# Patient Record
Sex: FEMALE | Race: WHITE
[De-identification: names, ages, dates, MRNs, and addresses within clinical notes are randomized per-mention and may not be internally consistent; named-entity substitution may affect disease eponyms.]

---

## 2020-07-20 ENCOUNTER — NURSE TRIAGE (OUTPATIENT)
Dept: OTHER | Facility: CLINIC | Age: 50
End: 2020-07-20

## 2020-07-20 NOTE — TELEPHONE ENCOUNTER
Reason for Disposition   Blood in urine (red, pink, or tea-colored)    Answer Assessment - Initial Assessment Questions  1. LOCATION: \"Where does it hurt? \" (e.g., left, right)      both  2. ONSET: \"When did the pain start? \"      This morning  3. SEVERITY: \"How bad is the pain? \" (e.g., Scale 1-10; mild, moderate, or severe)    - MILD (1-3): doesn't interfere with normal activities     - MODERATE (4-7): interferes with normal activities or awakens from sleep     - SEVERE (8-10): excruciating pain and patient unable to do normal activities (stays in bed)        6  4. PATTERN: \"Does the pain come and go, or is it constant? \"       *No Answer*  5. CAUSE: \"What do you think is causing the pain? \"      *No Answer*  6. OTHER SYMPTOMS:  \"Do you have any other symptoms? \" (e.g., fever, abdominal pain, vomiting, leg weakness, burning with urination, blood in urine)      Frequency, abd pain  7. PREGNANCY:  \"Is there any chance you are pregnant? \" \"When was your last menstrual period? \"      *No Answer*    Protocols used:  [facility]

## 2023-08-16 PROBLEM — J40 SINOBRONCHITIS: Status: RESOLVED | Noted: 2023-08-16 | Resolved: 2023-08-16

## 2023-08-16 PROBLEM — M65.949 TENOSYNOVITIS OF FINGER: Status: ACTIVE | Noted: 2023-08-16

## 2023-08-16 PROBLEM — R10.9 LEFT FLANK PAIN: Status: ACTIVE | Noted: 2023-08-16

## 2023-08-16 PROBLEM — R53.83 FATIGUE: Status: ACTIVE | Noted: 2023-08-16

## 2023-08-16 PROBLEM — H02.402 PTOSIS OF LEFT EYELID: Status: ACTIVE | Noted: 2023-08-16

## 2023-08-16 PROBLEM — J30.9 ALLERGIC RHINITIS: Status: ACTIVE | Noted: 2023-08-16

## 2023-08-16 PROBLEM — R10.9 ABDOMINAL PAIN: Status: ACTIVE | Noted: 2023-08-16

## 2023-08-16 PROBLEM — M25.562 LEFT KNEE PAIN: Status: ACTIVE | Noted: 2023-08-16

## 2023-08-16 PROBLEM — R29.898 LEG WEAKNESS, BILATERAL: Status: ACTIVE | Noted: 2019-09-16

## 2023-08-16 PROBLEM — M22.42 CHONDROMALACIA OF LEFT PATELLA: Status: ACTIVE | Noted: 2023-08-16

## 2023-08-16 PROBLEM — R39.89 BLADDER PAIN: Status: ACTIVE | Noted: 2023-08-16

## 2023-08-16 PROBLEM — J32.9 SINOBRONCHITIS: Status: RESOLVED | Noted: 2023-08-16 | Resolved: 2023-08-16

## 2023-08-16 PROBLEM — M54.16 LUMBAR NERVE ROOT IMPINGEMENT: Status: ACTIVE | Noted: 2023-08-16

## 2023-08-16 PROBLEM — L68.0 HIRSUTISM: Status: ACTIVE | Noted: 2023-08-16

## 2023-08-16 PROBLEM — M25.50 ARTHRALGIA: Status: ACTIVE | Noted: 2023-08-16

## 2023-08-16 PROBLEM — R31.29 OTHER MICROSCOPIC HEMATURIA: Status: ACTIVE | Noted: 2023-08-16

## 2023-08-16 PROBLEM — M43.16 SPONDYLOLISTHESIS, LUMBAR REGION: Status: ACTIVE | Noted: 2019-08-20

## 2023-08-16 PROBLEM — G89.29 CHRONIC LOWER BACK PAIN: Status: ACTIVE | Noted: 2023-08-16

## 2023-08-16 PROBLEM — F17.200 NICOTINE DEPENDENCE: Status: ACTIVE | Noted: 2023-08-16

## 2023-08-16 PROBLEM — R79.89 ELEVATED INSULIN-LIKE GROWTH FACTOR 1 (IGF-1) LEVEL: Status: ACTIVE | Noted: 2023-08-16

## 2023-08-16 PROBLEM — R31.9 HEMATURIA: Status: ACTIVE | Noted: 2023-08-16

## 2023-08-16 PROBLEM — R19.00 PELVIC MASS IN FEMALE: Status: ACTIVE | Noted: 2023-08-16

## 2023-08-16 PROBLEM — N20.0 CALCULUS OF KIDNEY: Status: ACTIVE | Noted: 2023-08-16

## 2023-08-16 PROBLEM — M54.30 SCIATICA: Status: ACTIVE | Noted: 2023-08-16

## 2023-08-16 PROBLEM — M65.9 TENOSYNOVITIS OF FINGER: Status: ACTIVE | Noted: 2023-08-16

## 2023-08-16 PROBLEM — N20.1 LEFT URETERAL CALCULUS: Status: ACTIVE | Noted: 2023-08-16

## 2023-08-16 PROBLEM — R35.0 URINARY FREQUENCY: Status: RESOLVED | Noted: 2023-08-16 | Resolved: 2023-08-16

## 2023-08-16 PROBLEM — M54.50 CHRONIC LOWER BACK PAIN: Status: ACTIVE | Noted: 2023-08-16

## 2023-08-16 PROBLEM — R10.9 RIGHT FLANK PAIN: Status: ACTIVE | Noted: 2023-08-16

## 2023-08-16 PROBLEM — R26.89 BALANCE DISORDER: Status: ACTIVE | Noted: 2019-09-16

## 2023-08-16 RX ORDER — ALBUTEROL SULFATE 90 UG/1
AEROSOL, METERED RESPIRATORY (INHALATION)
COMMUNITY
Start: 2020-01-05

## 2023-08-16 RX ORDER — FLUTICASONE PROPIONATE 50 MCG
1 SPRAY, SUSPENSION (ML) NASAL DAILY
COMMUNITY
Start: 2023-03-03 | End: 2023-08-17 | Stop reason: SDUPTHER

## 2023-08-16 RX ORDER — MEDROXYPROGESTERONE ACETATE 150 MG/ML
INJECTION, SUSPENSION INTRAMUSCULAR
COMMUNITY
Start: 2020-08-19 | End: 2023-08-17 | Stop reason: WASHOUT

## 2023-08-17 ENCOUNTER — OFFICE VISIT (OUTPATIENT)
Dept: PRIMARY CARE | Facility: CLINIC | Age: 53
End: 2023-08-17
Payer: COMMERCIAL

## 2023-08-17 VITALS
TEMPERATURE: 97.9 F | HEIGHT: 66 IN | SYSTOLIC BLOOD PRESSURE: 128 MMHG | WEIGHT: 171 LBS | BODY MASS INDEX: 27.48 KG/M2 | DIASTOLIC BLOOD PRESSURE: 82 MMHG

## 2023-08-17 DIAGNOSIS — R53.83 FATIGUE, UNSPECIFIED TYPE: ICD-10-CM

## 2023-08-17 DIAGNOSIS — R10.2 PELVIC PRESSURE IN FEMALE: ICD-10-CM

## 2023-08-17 DIAGNOSIS — Z00.00 HEALTH CARE MAINTENANCE: ICD-10-CM

## 2023-08-17 DIAGNOSIS — H65.113 ACUTE ALLERGIC SEROUS OTITIS MEDIA OF BOTH EARS: ICD-10-CM

## 2023-08-17 DIAGNOSIS — J02.9 PHARYNGITIS, UNSPECIFIED ETIOLOGY: ICD-10-CM

## 2023-08-17 DIAGNOSIS — R10.84 GENERALIZED ABDOMINAL PAIN: Primary | ICD-10-CM

## 2023-08-17 PROCEDURE — 99214 OFFICE O/P EST MOD 30 MIN: CPT | Performed by: NURSE PRACTITIONER

## 2023-08-17 RX ORDER — CEPHALEXIN 500 MG/1
500 CAPSULE ORAL 2 TIMES DAILY
Qty: 20 CAPSULE | Refills: 0 | Status: SHIPPED | OUTPATIENT
Start: 2023-08-17 | End: 2023-08-27

## 2023-08-17 RX ORDER — FLUTICASONE PROPIONATE 50 MCG
1 SPRAY, SUSPENSION (ML) NASAL DAILY
Qty: 16 G | Refills: 3 | Status: SHIPPED | OUTPATIENT
Start: 2023-08-17

## 2023-08-17 ASSESSMENT — ENCOUNTER SYMPTOMS
RESPIRATORY NEGATIVE: 1
MUSCULOSKELETAL NEGATIVE: 1
ROS GI COMMENTS: AS NOTED IN HPI
CARDIOVASCULAR NEGATIVE: 1
PSYCHIATRIC NEGATIVE: 1
CONSTITUTIONAL NEGATIVE: 1

## 2023-08-17 ASSESSMENT — PATIENT HEALTH QUESTIONNAIRE - PHQ9
2. FEELING DOWN, DEPRESSED OR HOPELESS: NOT AT ALL
SUM OF ALL RESPONSES TO PHQ9 QUESTIONS 1 AND 2: 0
1. LITTLE INTEREST OR PLEASURE IN DOING THINGS: NOT AT ALL

## 2023-08-17 NOTE — PROGRESS NOTES
"Subjective   Patient ID: Olimpia Fontanez is a 53 y.o. female who presents for Chest Pain (Intermittently under left breast/Would like to have labs done).    HPI Presents today with concerns for 3 different incidences of sudden up abdominal pain  9 months ago was the first episode and then  had once in June and once in July.   Sudden pain with lower middle breast area/  upper abdomen Last for a minute.   Usually is at rest. No radiating pain  Does seem to happen with movmenet  No soreness afterward  No cough or shortness of breath  Is a smoker  Left ovarian cyst 2020 removed and is also having some pelvic pressure and wants to make sure right ovary is ok  History of kidney stone  Had had sore throat and ear pressure for the last 4-5 days.  Has not checked for COVID.   No fever or chills.   Is also fatigued and is requesting blood work  Review of Systems   Constitutional: Negative.    HENT:          As noted in HPI     Respiratory: Negative.     Cardiovascular: Negative.    Gastrointestinal:         As noted in HPI     Genitourinary:         As noted in HPI     Musculoskeletal: Negative.    Psychiatric/Behavioral: Negative.         Objective   /82 (BP Location: Left arm, Patient Position: Sitting)   Temp 36.6 °C (97.9 °F) (Temporal)   Ht 1.676 m (5' 6\")   Wt 77.6 kg (171 lb)   BMI 27.60 kg/m²     Physical Exam  Constitutional:       Appearance: Normal appearance.   HENT:      Right Ear: A middle ear effusion is present.      Left Ear: A middle ear effusion is present.      Mouth/Throat:      Tongue: No lesions. Tongue does not deviate from midline.      Palate: No mass and lesions.      Pharynx: Posterior oropharyngeal erythema present. No pharyngeal swelling or oropharyngeal exudate.      Tonsils: No tonsillar exudate or tonsillar abscesses.   Cardiovascular:      Rate and Rhythm: Normal rate and regular rhythm.   Pulmonary:      Effort: Pulmonary effort is normal.      Breath sounds: Normal breath sounds. "   Abdominal:      General: Abdomen is flat. Bowel sounds are normal. There is no distension.      Palpations: Abdomen is soft. There is no mass.      Tenderness: There is abdominal tenderness in the epigastric area. There is no right CVA tenderness, left CVA tenderness, guarding or rebound.   Musculoskeletal:         General: Normal range of motion.   Lymphadenopathy:      Cervical: No cervical adenopathy.   Neurological:      General: No focal deficit present.      Mental Status: She is alert and oriented to person, place, and time.   Psychiatric:         Mood and Affect: Mood normal.         Behavior: Behavior normal.         Thought Content: Thought content normal.         Judgment: Judgment normal.     Had a rash with penicillin, has take keflex without any issue    Assessment/Plan   Problem List Items Addressed This Visit       Abdominal pain - Primary    Relevant Orders    US abdomen    Fatigue    Relevant Orders    Vitamin B12    Vitamin D 25-Hydroxy,Total     Other Visit Diagnoses       Pelvic pressure in female        Relevant Orders    US pelvis    Pharyngitis, unspecified etiology        Relevant Medications    cephalexin (Keflex) 500 mg capsule    Acute allergic serous otitis media of both ears        Relevant Medications    fluticasone (Flonase) 50 mcg/actuation nasal spray    Health care maintenance        Relevant Orders    CBC and Auto Differential    Comprehensive Metabolic Panel    Lipid Panel    Thyroid Stimulating Hormone

## 2023-08-17 NOTE — PATIENT INSTRUCTIONS
Please take the medication as directed.   I will follow up with the blood work and ultrasound  Flonase renewed

## 2023-08-18 ENCOUNTER — LAB (OUTPATIENT)
Dept: LAB | Facility: LAB | Age: 53
End: 2023-08-18
Payer: COMMERCIAL

## 2023-08-18 DIAGNOSIS — Z00.00 HEALTH CARE MAINTENANCE: ICD-10-CM

## 2023-08-18 DIAGNOSIS — R53.83 FATIGUE, UNSPECIFIED TYPE: ICD-10-CM

## 2023-08-18 LAB
ALANINE AMINOTRANSFERASE (SGPT) (U/L) IN SER/PLAS: 16 U/L (ref 7–45)
ALBUMIN (G/DL) IN SER/PLAS: 4.8 G/DL (ref 3.4–5)
ALKALINE PHOSPHATASE (U/L) IN SER/PLAS: 82 U/L (ref 33–110)
ANION GAP IN SER/PLAS: 10 MMOL/L (ref 10–20)
ASPARTATE AMINOTRANSFERASE (SGOT) (U/L) IN SER/PLAS: 16 U/L (ref 9–39)
BASOPHILS (10*3/UL) IN BLOOD BY AUTOMATED COUNT: 0.06 X10E9/L (ref 0–0.1)
BASOPHILS/100 LEUKOCYTES IN BLOOD BY AUTOMATED COUNT: 0.7 % (ref 0–2)
BILIRUBIN TOTAL (MG/DL) IN SER/PLAS: 0.6 MG/DL (ref 0–1.2)
CALCIDIOL (25 OH VITAMIN D3) (NG/ML) IN SER/PLAS: 55 NG/ML
CALCIUM (MG/DL) IN SER/PLAS: 10.2 MG/DL (ref 8.6–10.3)
CARBON DIOXIDE, TOTAL (MMOL/L) IN SER/PLAS: 28 MMOL/L (ref 21–32)
CHLORIDE (MMOL/L) IN SER/PLAS: 105 MMOL/L (ref 98–107)
CHOLESTEROL (MG/DL) IN SER/PLAS: 257 MG/DL (ref 0–199)
CHOLESTEROL IN HDL (MG/DL) IN SER/PLAS: 55.4 MG/DL
CHOLESTEROL/HDL RATIO: 4.6
COBALAMIN (VITAMIN B12) (PG/ML) IN SER/PLAS: 579 PG/ML (ref 211–911)
CREATININE (MG/DL) IN SER/PLAS: 0.74 MG/DL (ref 0.5–1.05)
EOSINOPHILS (10*3/UL) IN BLOOD BY AUTOMATED COUNT: 0.14 X10E9/L (ref 0–0.7)
EOSINOPHILS/100 LEUKOCYTES IN BLOOD BY AUTOMATED COUNT: 1.7 % (ref 0–6)
ERYTHROCYTE DISTRIBUTION WIDTH (RATIO) BY AUTOMATED COUNT: 12.6 % (ref 11.5–14.5)
ERYTHROCYTE MEAN CORPUSCULAR HEMOGLOBIN CONCENTRATION (G/DL) BY AUTOMATED: 33.8 G/DL (ref 32–36)
ERYTHROCYTE MEAN CORPUSCULAR VOLUME (FL) BY AUTOMATED COUNT: 93 FL (ref 80–100)
ERYTHROCYTES (10*6/UL) IN BLOOD BY AUTOMATED COUNT: 5.12 X10E12/L (ref 4–5.2)
GFR FEMALE: >90 ML/MIN/1.73M2
GLUCOSE (MG/DL) IN SER/PLAS: 104 MG/DL (ref 74–99)
HEMATOCRIT (%) IN BLOOD BY AUTOMATED COUNT: 47.4 % (ref 36–46)
HEMOGLOBIN (G/DL) IN BLOOD: 16 G/DL (ref 12–16)
IMMATURE GRANULOCYTES/100 LEUKOCYTES IN BLOOD BY AUTOMATED COUNT: 0.4 % (ref 0–0.9)
LDL: 162 MG/DL (ref 0–99)
LEUKOCYTES (10*3/UL) IN BLOOD BY AUTOMATED COUNT: 8.4 X10E9/L (ref 4.4–11.3)
LYMPHOCYTES (10*3/UL) IN BLOOD BY AUTOMATED COUNT: 2.44 X10E9/L (ref 1.2–4.8)
LYMPHOCYTES/100 LEUKOCYTES IN BLOOD BY AUTOMATED COUNT: 29.2 % (ref 13–44)
MONOCYTES (10*3/UL) IN BLOOD BY AUTOMATED COUNT: 0.63 X10E9/L (ref 0.1–1)
MONOCYTES/100 LEUKOCYTES IN BLOOD BY AUTOMATED COUNT: 7.5 % (ref 2–10)
NEUTROPHILS (10*3/UL) IN BLOOD BY AUTOMATED COUNT: 5.07 X10E9/L (ref 1.2–7.7)
NEUTROPHILS/100 LEUKOCYTES IN BLOOD BY AUTOMATED COUNT: 60.5 % (ref 40–80)
PLATELETS (10*3/UL) IN BLOOD AUTOMATED COUNT: 246 X10E9/L (ref 150–450)
POTASSIUM (MMOL/L) IN SER/PLAS: 4.3 MMOL/L (ref 3.5–5.3)
PROTEIN TOTAL: 6.9 G/DL (ref 6.4–8.2)
SODIUM (MMOL/L) IN SER/PLAS: 139 MMOL/L (ref 136–145)
THYROTROPIN (MIU/L) IN SER/PLAS BY DETECTION LIMIT <= 0.05 MIU/L: 2.15 MIU/L (ref 0.44–3.98)
TRIGLYCERIDE (MG/DL) IN SER/PLAS: 197 MG/DL (ref 0–149)
UREA NITROGEN (MG/DL) IN SER/PLAS: 23 MG/DL (ref 6–23)
VLDL: 39 MG/DL (ref 0–40)

## 2023-08-18 PROCEDURE — 82607 VITAMIN B-12: CPT

## 2023-08-18 PROCEDURE — 36415 COLL VENOUS BLD VENIPUNCTURE: CPT

## 2023-08-18 PROCEDURE — 82306 VITAMIN D 25 HYDROXY: CPT

## 2023-08-18 PROCEDURE — 85025 COMPLETE CBC W/AUTO DIFF WBC: CPT

## 2023-08-18 PROCEDURE — 80061 LIPID PANEL: CPT

## 2023-08-18 PROCEDURE — 80053 COMPREHEN METABOLIC PANEL: CPT

## 2023-08-18 PROCEDURE — 84443 ASSAY THYROID STIM HORMONE: CPT

## 2023-08-28 ENCOUNTER — TELEMEDICINE (OUTPATIENT)
Dept: PRIMARY CARE | Facility: CLINIC | Age: 53
End: 2023-08-28
Payer: COMMERCIAL

## 2023-08-28 DIAGNOSIS — E78.2 MIXED HYPERLIPIDEMIA: Primary | ICD-10-CM

## 2023-08-28 DIAGNOSIS — R73.01 ELEVATED FASTING BLOOD SUGAR: ICD-10-CM

## 2023-08-28 PROCEDURE — 99213 OFFICE O/P EST LOW 20 MIN: CPT | Performed by: NURSE PRACTITIONER

## 2023-08-28 ASSESSMENT — ENCOUNTER SYMPTOMS
RESPIRATORY NEGATIVE: 1
CARDIOVASCULAR NEGATIVE: 1
PSYCHIATRIC NEGATIVE: 1
CONSTITUTIONAL NEGATIVE: 1

## 2023-08-28 NOTE — PROGRESS NOTES
Subjective   Patient ID: Olimpia Fontanez is a 53 y.o. female who presents for Follow-up.    HPI Presents today virtually for follow up on blood work as noted 8/18/2023  States diet is whole foods.  Has some diarrhea issues after eating so tends to eat most of her calories at night after getting home form work.  Is not exercising regularly because of back pain. Is a smoker.   Is post menopausal.         Review of Systems   Constitutional: Negative.    Respiratory: Negative.     Cardiovascular: Negative.    Psychiatric/Behavioral: Negative.         Objective   There were no vitals taken for this visit.    Physical Exam  Constitutional:       Appearance: Normal appearance.   Pulmonary:      Effort: Pulmonary effort is normal.   Neurological:      Mental Status: She is alert.   Psychiatric:         Mood and Affect: Mood normal.         Behavior: Behavior normal.         Thought Content: Thought content normal.         Judgment: Judgment normal.         Assessment/Plan   Problem List Items Addressed This Visit    None  Visit Diagnoses       Mixed hyperlipidemia    -  Primary    Relevant Orders    CT cardiac scoring wo IV contrast    Elevated fasting blood sugar        Relevant Orders    CT cardiac scoring wo IV contrast

## 2023-10-30 ENCOUNTER — ANCILLARY PROCEDURE (OUTPATIENT)
Dept: RADIOLOGY | Facility: CLINIC | Age: 53
End: 2023-10-30
Payer: COMMERCIAL

## 2023-10-30 DIAGNOSIS — E78.2 MIXED HYPERLIPIDEMIA: ICD-10-CM

## 2023-10-30 DIAGNOSIS — R73.01 IMPAIRED FASTING GLUCOSE: ICD-10-CM

## 2023-10-30 PROCEDURE — 75571 CT HRT W/O DYE W/CA TEST: CPT

## 2023-11-09 ENCOUNTER — TELEMEDICINE (OUTPATIENT)
Dept: PRIMARY CARE | Facility: CLINIC | Age: 53
End: 2023-11-09
Payer: COMMERCIAL

## 2023-11-09 DIAGNOSIS — E78.2 MIXED HYPERLIPIDEMIA: Primary | ICD-10-CM

## 2023-11-09 PROCEDURE — 99213 OFFICE O/P EST LOW 20 MIN: CPT | Performed by: NURSE PRACTITIONER

## 2023-11-09 RX ORDER — ATORVASTATIN CALCIUM 10 MG/1
10 TABLET, FILM COATED ORAL DAILY
Qty: 90 TABLET | Refills: 0 | Status: SHIPPED | OUTPATIENT
Start: 2023-11-09 | End: 2024-01-02 | Stop reason: ALTCHOICE

## 2023-11-09 ASSESSMENT — ENCOUNTER SYMPTOMS
NEUROLOGICAL NEGATIVE: 1
PSYCHIATRIC NEGATIVE: 1
CARDIOVASCULAR NEGATIVE: 1
RESPIRATORY NEGATIVE: 1
MUSCULOSKELETAL NEGATIVE: 1
CONSTITUTIONAL NEGATIVE: 1

## 2023-11-09 NOTE — PROGRESS NOTES
Subjective   Patient ID: Olimpia Fontanez is a 53 y.o. female who presents for Follow-up.    HPI Presents today for virtual visit to go over blood results and CACS as noted  CACS 52. Discussed risk.  Patient is a smoker and will work on quitting.   Discussed CV risk.   Disussed riska dn benefits/side effects of statin. Will start and recheck blood work on 6 weeks, Patient in agreement with plan      Review of Systems   Constitutional: Negative.    Respiratory: Negative.     Cardiovascular: Negative.    Musculoskeletal: Negative.    Neurological: Negative.    Psychiatric/Behavioral: Negative.         Objective   There were no vitals taken for this visit.    Physical Exam  Constitutional:       Appearance: Normal appearance.   Pulmonary:      Effort: Pulmonary effort is normal.   Neurological:      Mental Status: She is alert.   Psychiatric:         Mood and Affect: Mood normal.         Behavior: Behavior normal.         Thought Content: Thought content normal.         Judgment: Judgment normal.         Assessment/Plan   Problem List Items Addressed This Visit    None  Visit Diagnoses         Codes    Mixed hyperlipidemia    -  Primary E78.2    Relevant Medications    atorvastatin (Lipitor) 10 mg tablet    Other Relevant Orders    Lipid Panel    AST    ALT

## 2023-11-09 NOTE — PATIENT INSTRUCTIONS
I have started you on medication for your cholesterol.   Please take the medication at night  Please quit smoking  Follow up with blood work in 6 weeks.

## 2023-12-06 ENCOUNTER — LAB (OUTPATIENT)
Dept: LAB | Facility: LAB | Age: 53
End: 2023-12-06
Payer: COMMERCIAL

## 2023-12-06 DIAGNOSIS — E78.2 MIXED HYPERLIPIDEMIA: ICD-10-CM

## 2023-12-06 LAB
ALT SERPL W P-5'-P-CCNC: 25 U/L (ref 7–45)
AST SERPL W P-5'-P-CCNC: 18 U/L (ref 9–39)
CHOLEST SERPL-MCNC: 217 MG/DL (ref 0–199)
CHOLESTEROL/HDL RATIO: 3.3
HDLC SERPL-MCNC: 64.9 MG/DL
LDLC SERPL CALC-MCNC: 131 MG/DL
NON HDL CHOLESTEROL: 152 MG/DL (ref 0–149)
TRIGL SERPL-MCNC: 106 MG/DL (ref 0–149)
VLDL: 21 MG/DL (ref 0–40)

## 2023-12-06 PROCEDURE — 84460 ALANINE AMINO (ALT) (SGPT): CPT

## 2023-12-06 PROCEDURE — 80061 LIPID PANEL: CPT

## 2023-12-06 PROCEDURE — 84450 TRANSFERASE (AST) (SGOT): CPT

## 2023-12-06 PROCEDURE — 36415 COLL VENOUS BLD VENIPUNCTURE: CPT

## 2023-12-29 DIAGNOSIS — E78.2 MIXED HYPERLIPIDEMIA: Primary | ICD-10-CM

## 2024-01-02 RX ORDER — ROSUVASTATIN CALCIUM 10 MG/1
10 TABLET, COATED ORAL DAILY
Qty: 90 TABLET | Refills: 0 | Status: SHIPPED | OUTPATIENT
Start: 2024-01-02 | End: 2024-06-30

## 2024-01-30 PROBLEM — R29.898 LEG WEAKNESS, BILATERAL: Status: RESOLVED | Noted: 2019-09-16 | Resolved: 2024-01-30

## 2024-01-30 PROBLEM — R31.29 OTHER MICROSCOPIC HEMATURIA: Status: RESOLVED | Noted: 2023-08-16 | Resolved: 2024-01-30

## 2024-01-30 PROBLEM — R31.9 HEMATURIA: Status: RESOLVED | Noted: 2023-08-16 | Resolved: 2024-01-30

## 2024-01-30 PROBLEM — R19.00 PELVIC MASS IN FEMALE: Status: RESOLVED | Noted: 2023-08-16 | Resolved: 2024-01-30

## 2024-01-30 PROBLEM — R26.89 BALANCE DISORDER: Status: RESOLVED | Noted: 2019-09-16 | Resolved: 2024-01-30

## 2024-01-30 PROBLEM — R10.9 LEFT FLANK PAIN: Status: RESOLVED | Noted: 2023-08-16 | Resolved: 2024-01-30

## 2024-01-30 PROBLEM — R10.9 RIGHT FLANK PAIN: Status: RESOLVED | Noted: 2023-08-16 | Resolved: 2024-01-30

## 2024-01-30 RX ORDER — LORATADINE 10 MG/1
10 TABLET ORAL
COMMUNITY
Start: 2023-10-10

## 2024-01-31 ENCOUNTER — OFFICE VISIT (OUTPATIENT)
Dept: PRIMARY CARE | Facility: CLINIC | Age: 54
End: 2024-01-31
Payer: COMMERCIAL

## 2024-01-31 ENCOUNTER — HOSPITAL ENCOUNTER (OUTPATIENT)
Dept: RADIOLOGY | Facility: CLINIC | Age: 54
Discharge: HOME | End: 2024-01-31
Payer: COMMERCIAL

## 2024-01-31 VITALS
TEMPERATURE: 97.9 F | BODY MASS INDEX: 27.64 KG/M2 | HEART RATE: 110 BPM | HEIGHT: 66 IN | OXYGEN SATURATION: 98 % | DIASTOLIC BLOOD PRESSURE: 80 MMHG | WEIGHT: 172 LBS | SYSTOLIC BLOOD PRESSURE: 130 MMHG

## 2024-01-31 DIAGNOSIS — F17.200 CURRENT SMOKER: ICD-10-CM

## 2024-01-31 DIAGNOSIS — R09.82 POST-NASAL DRAINAGE: ICD-10-CM

## 2024-01-31 DIAGNOSIS — R05.2 SUBACUTE COUGH: ICD-10-CM

## 2024-01-31 DIAGNOSIS — R05.2 SUBACUTE COUGH: Primary | ICD-10-CM

## 2024-01-31 PROCEDURE — 71046 X-RAY EXAM CHEST 2 VIEWS: CPT | Performed by: RADIOLOGY

## 2024-01-31 PROCEDURE — 71046 X-RAY EXAM CHEST 2 VIEWS: CPT

## 2024-01-31 PROCEDURE — 99214 OFFICE O/P EST MOD 30 MIN: CPT | Performed by: NURSE PRACTITIONER

## 2024-01-31 RX ORDER — AZELASTINE 1 MG/ML
2 SPRAY, METERED NASAL 2 TIMES DAILY
Qty: 30 ML | Refills: 1 | Status: SHIPPED | OUTPATIENT
Start: 2024-01-31 | End: 2025-01-30

## 2024-01-31 ASSESSMENT — ENCOUNTER SYMPTOMS
MUSCULOSKELETAL NEGATIVE: 1
CARDIOVASCULAR NEGATIVE: 1
NEUROLOGICAL NEGATIVE: 1
PSYCHIATRIC NEGATIVE: 1
CONSTITUTIONAL NEGATIVE: 1

## 2024-01-31 ASSESSMENT — PATIENT HEALTH QUESTIONNAIRE - PHQ9
1. LITTLE INTEREST OR PLEASURE IN DOING THINGS: NOT AT ALL
SUM OF ALL RESPONSES TO PHQ9 QUESTIONS 1 AND 2: 0
2. FEELING DOWN, DEPRESSED OR HOPELESS: NOT AT ALL

## 2024-01-31 NOTE — PROGRESS NOTES
"Subjective   Patient ID: Olimpia Fontanez is a 53 y.o. female who presents for URI (Continued post nasal drip, bilateral ear pain).    HPI Presents today with ongoing post nasal drainage that is causing her to cough.  Has similar episode last spring and resolve over the summer.  Started again after getting sick in September.   Started with a sore throat,headache.  Testing negative for COVID and flu.  Was put on antibiotics at urgent care.   Helped some but then flared again, went back and put on another course of antibiotic.  Total three different courses.  Has been waxing and waning since then  Last few weeks constant post nasal drainage.  Makes her cough.   Flonase did not seem to help.  Has been off for a week.   Claritin started last week no help  Is coughing more int he last month  Still smoking  Last antibiotics was October.  Review of Systems   Constitutional: Negative.    HENT:          As noted in HPI     Respiratory:          As noted in HPI     Cardiovascular: Negative.    Musculoskeletal: Negative.    Neurological: Negative.    Psychiatric/Behavioral: Negative.         Objective   /80 (BP Location: Left arm, Patient Position: Sitting)   Pulse 110   Temp 36.6 °C (97.9 °F) (Temporal)   Ht 1.676 m (5' 6\")   Wt 78 kg (172 lb)   SpO2 98%   BMI 27.76 kg/m²     Physical Exam  Constitutional:       Appearance: Normal appearance.   HENT:      Right Ear: Ear canal and external ear normal. A middle ear effusion is present.      Left Ear: Ear canal and external ear normal. A middle ear effusion is present. Tympanic membrane is bulging.   Cardiovascular:      Rate and Rhythm: Normal rate and regular rhythm.   Pulmonary:      Effort: Pulmonary effort is normal.      Comments: Course breath sounds noted on the left side upper and lower.   Musculoskeletal:         General: Normal range of motion.   Neurological:      General: No focal deficit present.      Mental Status: She is alert.   Psychiatric:         " Mood and Affect: Mood normal.         Behavior: Behavior normal.         Assessment/Plan   Problem List Items Addressed This Visit    None  Visit Diagnoses         Codes    Post-nasal drainage    -  Primary R09.82    Relevant Medications    azelastine (Astelin) 137 mcg (0.1 %) nasal spray    Subacute cough     R05.2    Relevant Orders    XR chest 2 views    Current smoker     F17.200    Relevant Orders    XR chest 2 views

## 2024-01-31 NOTE — PATIENT INSTRUCTIONS
I will follow up with chest xray.   Please start the azelastine nasal spray.   Can stop the Claritin.   If no better in 1-2 weeks I advise that you go back and see the ENT.   Please stop smoking

## 2024-04-23 ENCOUNTER — OFFICE VISIT (OUTPATIENT)
Dept: PRIMARY CARE | Facility: CLINIC | Age: 54
End: 2024-04-23
Payer: COMMERCIAL

## 2024-04-23 VITALS
BODY MASS INDEX: 28.44 KG/M2 | HEART RATE: 67 BPM | OXYGEN SATURATION: 95 % | DIASTOLIC BLOOD PRESSURE: 78 MMHG | TEMPERATURE: 97.9 F | WEIGHT: 176.2 LBS | SYSTOLIC BLOOD PRESSURE: 120 MMHG

## 2024-04-23 DIAGNOSIS — B02.9 HERPES ZOSTER WITHOUT COMPLICATION: Primary | ICD-10-CM

## 2024-04-23 RX ORDER — VALACYCLOVIR HYDROCHLORIDE 1 G/1
1000 TABLET, FILM COATED ORAL 3 TIMES DAILY
Qty: 21 TABLET | Refills: 0 | Status: SHIPPED | OUTPATIENT
Start: 2024-04-23 | End: 2024-04-30

## 2024-04-23 ASSESSMENT — ENCOUNTER SYMPTOMS
DEPRESSION: 0
OCCASIONAL FEELINGS OF UNSTEADINESS: 0
LOSS OF SENSATION IN FEET: 0

## 2024-04-23 NOTE — PROGRESS NOTES
Subjective   Patient ID: Olimpia Fontanez is a 54 y.o. female who presents for Possible shingles.    # Behind left ear began appearing approximately 4 days ago.  There is burning and itching now she is getting burning in sunburn like sensation on the back of her left shoulder and in the corner of her left eye.  She has had shingles back in 2012.  This seems similar to that episode.  She is not having any chest pain shortness of breath dyspnea on exertion.  She does have risk factors for CAD including smoking, elevated cholesterol and she discontinue the use of her statin.    Objective   /78 (BP Location: Left arm, Patient Position: Sitting, BP Cuff Size: Adult)   Pulse 67   Temp 36.6 °C (97.9 °F) (Skin)   Wt 79.9 kg (176 lb 3.2 oz)   SpO2 95%   BMI 28.44 kg/m²     Physical Exam  Constitutional:       Appearance: Normal appearance. She is normal weight.   HENT:      Head: Normocephalic and atraumatic.      Nose: Nose normal.      Mouth/Throat:      Mouth: Mucous membranes are moist.      Pharynx: Oropharynx is clear.   Eyes:      Extraocular Movements: Extraocular movements intact.      Conjunctiva/sclera: Conjunctivae normal.      Pupils: Pupils are equal, round, and reactive to light.   Pulmonary:      Effort: Pulmonary effort is normal.   Neurological:      General: No focal deficit present.      Mental Status: She is alert.   Psychiatric:         Mood and Affect: Mood normal.         Assessment/Plan   Diagnoses and all orders for this visit:  Herpes zoster without complication  -     valACYclovir (Valtrex) 1 gram tablet; Take 1 tablet (1,000 mg) by mouth 3 times a day for 7 days.    Rash is very consistent with shingles like appearance with crusted over vesicles.  She is still getting the burning sensation on her back.  Will prescribe Valtrex but pain is currently tolerable and does not need anything additional.  Did tell her to follow-up with us if she is having pain and can consider something like  gabapentin if necessary.  Since there is involvement with her left I am the tingling sensation that she feels I instructed her to follow-up with ophthalmology so they can do a more thorough eye exam.  She is amenable to this plan.    Angela Hawkins,      I spent a total of 11 minutes on the date of the service which included preparing to see the patient, face-to-face patient care, completing clinical documentation, performing a medically appropriate examination, counseling and educating the patient/family/caregiver and ordering medications, tests, or procedures.

## 2024-11-06 DIAGNOSIS — H65.113 ACUTE ALLERGIC SEROUS OTITIS MEDIA OF BOTH EARS: ICD-10-CM

## 2024-11-07 RX ORDER — FLUTICASONE PROPIONATE 50 MCG
1 SPRAY, SUSPENSION (ML) NASAL DAILY
Qty: 16 G | Refills: 0 | Status: SHIPPED | OUTPATIENT
Start: 2024-11-07

## 2025-01-07 ENCOUNTER — APPOINTMENT (OUTPATIENT)
Facility: HOSPITAL | Age: 55
End: 2025-01-07
Payer: COMMERCIAL

## 2025-04-08 ENCOUNTER — OFFICE VISIT (OUTPATIENT)
Dept: PRIMARY CARE | Facility: CLINIC | Age: 55
End: 2025-04-08
Payer: COMMERCIAL

## 2025-04-08 VITALS
SYSTOLIC BLOOD PRESSURE: 112 MMHG | OXYGEN SATURATION: 98 % | HEART RATE: 86 BPM | HEIGHT: 66 IN | WEIGHT: 180.4 LBS | DIASTOLIC BLOOD PRESSURE: 70 MMHG | BODY MASS INDEX: 28.99 KG/M2 | TEMPERATURE: 98.3 F

## 2025-04-08 DIAGNOSIS — H65.113 ACUTE ALLERGIC SEROUS OTITIS MEDIA OF BOTH EARS: ICD-10-CM

## 2025-04-08 PROCEDURE — 99213 OFFICE O/P EST LOW 20 MIN: CPT | Performed by: NURSE PRACTITIONER

## 2025-04-08 PROCEDURE — 3008F BODY MASS INDEX DOCD: CPT | Performed by: NURSE PRACTITIONER

## 2025-04-08 RX ORDER — FLUTICASONE PROPIONATE 50 MCG
1 SPRAY, SUSPENSION (ML) NASAL DAILY
Qty: 16 G | Refills: 5 | Status: SHIPPED | OUTPATIENT
Start: 2025-04-08

## 2025-04-08 ASSESSMENT — ENCOUNTER SYMPTOMS
SINUS PRESSURE: 0
CHILLS: 0
FEVER: 0
RHINORRHEA: 0
SINUS PAIN: 0
SORE THROAT: 0
FATIGUE: 1
SHORTNESS OF BREATH: 0
WHEEZING: 0
COUGH: 1
VOICE CHANGE: 0

## 2025-04-08 NOTE — PROGRESS NOTES
"Subjective   Olimpia Fontanez is a 54 y.o. female who presents for Earache (Started on Sunday. Ear pain that radiates down to the right side of her face. She has a headache and sore throat but only on right side. Denies having any fever and chills. ).    Earache   Associated symptoms include coughing. Pertinent negatives include no rhinorrhea or sore throat.     She presents to the office today for evaluation of right ear pain. She reports this started Sunday night- woke her up in the middle of the night. Pain behind the right ear.  No drainage from the ear.  No fullness or popping.  No hearing loss.  (+) sore lymph nodes on the right.    Right side of throat is sore.  Today started with a headache.   (+) PND on the right side.  (+) starting to get stuffy on the right side of the nose just today  No cough or runny nose.  No fever or chills.  Has seen ENT in the past and was told she may need tubes in ears.  Has not used Flonase for about 6 months.  Has not taken anything over the counter.    Review of Systems   Constitutional:  Positive for fatigue. Negative for chills and fever.   HENT:  Positive for congestion, ear pain and postnasal drip. Negative for rhinorrhea, sinus pressure, sinus pain, sore throat and voice change.    Respiratory:  Positive for cough. Negative for shortness of breath and wheezing.      Objective   /70 (BP Location: Left arm, Patient Position: Sitting)   Pulse 86   Temp 36.8 °C (98.3 °F) (Oral)   Ht 1.677 m (5' 6.02\")   Wt 81.8 kg (180 lb 6.4 oz)   SpO2 98%   BMI 29.10 kg/m²     Physical Exam  Constitutional:       General: She is not in acute distress.     Appearance: Normal appearance. She is not toxic-appearing.   HENT:      Right Ear: Tympanic membrane, ear canal and external ear normal.      Left Ear: Tympanic membrane, ear canal and external ear normal.      Nose: Nose normal.      Mouth/Throat:      Mouth: Mucous membranes are moist.      Pharynx: Oropharynx is clear. No " oropharyngeal exudate or posterior oropharyngeal erythema.   Neck:      Comments: (+) cervical lymph node tenderness and slight enlargement on the right.  Cardiovascular:      Rate and Rhythm: Normal rate and regular rhythm.      Heart sounds: Normal heart sounds, S1 normal and S2 normal. No murmur heard.  Pulmonary:      Effort: Pulmonary effort is normal.      Breath sounds: Normal breath sounds and air entry. No wheezing.   Lymphadenopathy:      Cervical: Cervical adenopathy present.   Neurological:      Mental Status: She is alert and oriented to person, place, and time.   Psychiatric:         Attention and Perception: Attention normal.         Mood and Affect: Mood and affect normal.         Behavior: Behavior is cooperative.         Thought Content: Thought content normal.         Judgment: Judgment normal.         Assessment/Plan   Problem List Items Addressed This Visit    None  Visit Diagnoses       Acute allergic serous otitis media of both ears        Relevant Medications    fluticasone (Flonase) 50 mcg/actuation nasal spray          It has been a pleasure seeing you today!

## 2025-04-08 NOTE — PATIENT INSTRUCTIONS
Begin using Flonase as discussed.  You may alternate Tylenol and Ibuprofen as needed for pain.  Let me know if no improvement or symptoms worse.

## 2025-08-05 DIAGNOSIS — Z12.31 ENCOUNTER FOR SCREENING MAMMOGRAM FOR BREAST CANCER: ICD-10-CM
